# Patient Record
Sex: MALE | Race: ASIAN | Employment: OTHER | URBAN - METROPOLITAN AREA
[De-identification: names, ages, dates, MRNs, and addresses within clinical notes are randomized per-mention and may not be internally consistent; named-entity substitution may affect disease eponyms.]

---

## 2021-11-29 NOTE — PROGRESS NOTES
HPI: Az López (: 1960) is a 64 y.o. male, patient, here for evaluation of the following chief complaint(s):    New Patient (right wrist pain)  Patient is seen today to evaluate his right wrist.  He has complained of pain to the radial side of his right wrist for 6 months. He thought this may have started from playing tennis. He really denies any numbness or tingling although at times he will sleep with the brace for comfort. He states that the pain localization has been hard to pinpoint. He denies any left wrist or hand pain and is seen today for further treatment of his right wrist.    Vitals: Wt 145 lb (65.8 kg)   BMI 20.81 kg/m²    Body mass index is 20.81 kg/m². Allergies   Allergen Reactions    Pcn [Penicillins] Other (comments)     As a child    Sulfa (Sulfonamide Antibiotics) Rash       Current Outpatient Medications   Medication Sig    simvastatin (ZOCOR) 20 mg tablet TAKE 1 TABLET BY MOUTH EVERY EVENING    glucose blood VI test strips (ONETOUCH ULTRA TEST) strip Test daily    KOMBIGLYZE XR 2.5-1,000 mg TM24 TAKE 2 TABLETS BY MOUTH EVERY DAY AFTER BREAKFAST    repaglinide (PRANDIN) 2 mg tablet TAKE 1 TABLET BY MOUTH BEFORE BREAKFAST, LUNCH, AND DINNER. MAY TAKE 1 TO 2 TABLETS DEPENDING ON MEAL.  Blood-Glucose Meter monitoring kit accu check stacy meter    Lancets misc Test daily    glucose blood VI test strips (BLOOD GLUCOSE TEST) strip accu check stacy test daily    aspirin delayed-release 81 mg tablet Take  by mouth daily. No current facility-administered medications for this visit. Past Medical History:   Diagnosis Date    DM type 2 (diabetes mellitus, type 2) ([address] 2010    Other and unspecified hyperlipidemia         No past surgical history on file.     Family History   Problem Relation Age of Onset    Diabetes Mother     Heart Disease Father         pacemaker    Diabetes Maternal Grandmother     Diabetes Daughter         Social History     Tobacco Use    Smoking status: Never Smoker    Smokeless tobacco: Never Used   Substance Use Topics    Alcohol use: Yes     Alcohol/week: 1.7 standard drinks     Types: 2 Cans of beer per week    Drug use: No        Review of Systems    ROS     Positive for: Musculoskeletal (right wrist)    Negative for: Constitutional, Gastrointestinal, Neurological, Skin, Genitourinary, HENT, Endocrine, Cardiovascular, Eyes, Respiratory, Psychiatric, Allergic/Imm, Heme/Lymph    Last edited by Fidel Romero RN on 11/30/2021  1:55 PM. (History)             Physical Exam    Examination of the left wrist reveals no swelling, edema or warmth, no tenderness to palpation, no pain, normal strength and tone, normal range of motion, no crepitus, normal sensation, no instability or laxity and no known fractures or deformities. Examination of the left hand reveals no tenderness to palpation, no pain, normal  strength, normal tone, normal range of motion, no crepitus, no instability or laxity, no known fractures or deformities and normal sensation. Examination of the right wrist reveals no swelling, edema or warmth, no tenderness to palpation, no pain, normal strength and tone, normal range of motion, no crepitus, normal sensation, no instability or laxity and no known fractures or deformities. Examination of the right hand reveals no tenderness to palpation, no pain, normal  strength, normal tone, normal range of motion, no crepitus, no instability or laxity, no known fractures or deformities and normal sensation. The right wrist has localized discomfort at the dorsal radial side but had a negative Finkelstein test.  No pain with grind test in the basal joint which was smooth. Questionable discomfort near STT joint.   No cyst.    Imaging:    XR Results (most recent):  Results from Appointment encounter on 11/30/21    XR WRIST RT AP/LAT/OBL MIN 3V    Narrative  3 x-ray views of the right wrist appear to show normal osseous and joint space findings no evidence of fracture or significant arthritis. ASSESSMENT/PLAN:  Below is the assessment and plan developed based on review of pertinent history, physical exam, labs, studies, and medications. Patient's examination was consistent with radial sided right wrist pain tendinitis but possible occult ganglion or early STT arthritis cannot be excluded. He has been symptomatic since the spring and at this point it appears reasonable and appropriate to proceed with an MRI study to evaluate for an occult ganglion or deeper arthritic findings. He had been offered previously a steroid and deferred an injection. I will see him back once the MRI is completed for review. 1. Right wrist pain  -     XR WRIST RT AP/LAT/OBL MIN 3V; Future  -     MRI WRIST RT WO CONT; Future  2. Right wrist tendinitis      No follow-ups on file. An electronic signature was used to authenticate this note.   -- Farhana Dalton MD

## 2021-11-30 ENCOUNTER — OFFICE VISIT (OUTPATIENT)
Dept: ORTHOPEDIC SURGERY | Age: 61
End: 2021-11-30
Payer: COMMERCIAL

## 2021-11-30 VITALS — BODY MASS INDEX: 20.81 KG/M2 | WEIGHT: 145 LBS

## 2021-11-30 DIAGNOSIS — M25.531 RIGHT WRIST PAIN: Primary | ICD-10-CM

## 2021-11-30 DIAGNOSIS — M77.8 RIGHT WRIST TENDINITIS: ICD-10-CM

## 2021-11-30 PROCEDURE — 99203 OFFICE O/P NEW LOW 30 MIN: CPT | Performed by: ORTHOPAEDIC SURGERY

## 2021-12-28 ENCOUNTER — OFFICE VISIT (OUTPATIENT)
Dept: ORTHOPEDIC SURGERY | Age: 61
End: 2021-12-28
Payer: COMMERCIAL

## 2021-12-28 DIAGNOSIS — M77.8 RIGHT WRIST TENDINITIS: Primary | ICD-10-CM

## 2021-12-28 DIAGNOSIS — M67.431 GANGLION CYST OF VOLAR ASPECT OF RIGHT WRIST: ICD-10-CM

## 2021-12-28 DIAGNOSIS — M25.531 RIGHT WRIST PAIN: ICD-10-CM

## 2021-12-28 PROCEDURE — 99213 OFFICE O/P EST LOW 20 MIN: CPT | Performed by: ORTHOPAEDIC SURGERY

## 2021-12-28 NOTE — PATIENT INSTRUCTIONS
Ganglions: Care Instructions  Your Care Instructions     A ganglion is a small sac, or cyst, filled with a clear fluid that is like jelly. A ganglion may look like a bump on the hand or wrist. It also can appear on your feet, ankles, knees, or shoulders. It is not cancer. A ganglion can grow out of the protective area, or capsule, around a joint. It also can grow on a tendon sheath, which covers the ropelike tendons that connect muscle to bone. A ganglion may hurt or cause numbness if it presses on a nerve. Many ganglions do not need treatment, and they often go away on their own. But if a ganglion hurts, becomes larger, causes numbness, or limits your activity, your doctor may want to drain it with a needle and syringe or remove it with minor surgery. Follow-up care is a key part of your treatment and safety. Be sure to make and go to all appointments, and call your doctor if you are having problems. It's also a good idea to know your test results and keep a list of the medicines you take. How can you care for yourself at home? · Wear a wrist or finger splint as directed by your doctor. It will keep your wrist or hand from moving and help reduce the fluid in the cyst. This may be all you need for the ganglion to shrink and go away. · Do not smash a ganglion with a book or other heavy object. You may break a bone or otherwise injure your wrist by trying this folk remedy, and the ganglion may return anyway. · Do not try to drain the fluid by poking the ganglion with a pin or any other sharp object. You could cause an infection. When should you call for help? Call your doctor now or seek immediate medical care if:    · You have signs of infection, such as:  ? Increased pain, swelling, warmth, or redness. ? Red streaks leading from the cyst.  ? Pus draining from the cyst.  ? A fever.    Watch closely for changes in your health, and be sure to contact your doctor if:    · You have increasing pain.     · Your ganglion is getting larger.     · You still have pain or numbness from a ganglion. Where can you learn more? Go to http://www.gray.com/  Enter [id number] in the search box to learn more about \"Ganglions: Care Instructions. \"  Current as of: July 1, 2021               Content Version: 13.0  © 6947-1179 [employer]. Care instructions adapted under license by [employer] (which disclaims liability or warranty for this information). If you have questions about a medical condition or this instruction, always ask your healthcare professional. [place] [address] any warranty or liability for your use of this information.

## 2021-12-28 NOTE — PROGRESS NOTES
HPI: Erin Schreiber (: 1960) is a 64 y.o. male, patient, here for evaluation of the following chief complaint(s):    Results (his following up from his 21 visit to review his right wrist MRI which was completed on 21)  Patient is seen today to evaluate his right wrist.  He has complained of pain to the radial side of his right wrist for 6 months. He thought this may have started from playing tennis. He really denies any numbness or tingling although at times he will sleep with the brace for comfort. He states that the pain localization has been hard to pinpoint. He denies any left wrist or hand pain and is seen today for further treatment of his right wrist.    Vitals: There were no vitals taken for this visit. There is no height or weight on file to calculate BMI. Allergies   Allergen Reactions    Pcn [Penicillins] Other (comments)     As a child    Sulfa (Sulfonamide Antibiotics) Rash       Current Outpatient Medications   Medication Sig    simvastatin (ZOCOR) 20 mg tablet TAKE 1 TABLET BY MOUTH EVERY EVENING    glucose blood VI test strips (ONETOUCH ULTRA TEST) strip Test daily    KOMBIGLYZE XR 2.5-1,000 mg TM24 TAKE 2 TABLETS BY MOUTH EVERY DAY AFTER BREAKFAST    repaglinide (PRANDIN) 2 mg tablet TAKE 1 TABLET BY MOUTH BEFORE BREAKFAST, LUNCH, AND DINNER. MAY TAKE 1 TO 2 TABLETS DEPENDING ON MEAL.  Blood-Glucose Meter monitoring kit accu check stacy meter    Lancets misc Test daily    glucose blood VI test strips (BLOOD GLUCOSE TEST) strip accu check stacy test daily    aspirin delayed-release 81 mg tablet Take  by mouth daily. No current facility-administered medications for this visit. Past Medical History:   Diagnosis Date    [facility]    Other and unspecified hyperlipidemia         History reviewed. No pertinent surgical history.     Family History   Problem Relation Age of Onset    Diabetes Mother     Heart Disease Father         pacemaker    Diabetes Maternal Grandmother     Diabetes Daughter         Social History     Tobacco Use    Smoking status: Never Smoker    Smokeless tobacco: Never Used   Substance Use Topics    Alcohol use: Yes     Alcohol/week: 1.7 standard drinks     Types: 2 Cans of beer per week    Drug use: No        Review of Systems    ROS     Positive for: Musculoskeletal (right wrist pain)    Negative for: Constitutional, Gastrointestinal, Neurological, Skin, Genitourinary, HENT, Endocrine, Cardiovascular, Eyes, Respiratory, Psychiatric, Allergic/Imm, Heme/Lymph    Last edited by Bal Mccray on 12/28/2021  4:09 PM. (History)             Physical Exam    Examination of the left wrist reveals no swelling, edema or warmth, no tenderness to palpation, no pain, normal strength and tone, normal range of motion, no crepitus, normal sensation, no instability or laxity and no known fractures or deformities. Examination of the left hand reveals no tenderness to palpation, no pain, normal  strength, normal tone, normal range of motion, no crepitus, no instability or laxity, no known fractures or deformities and normal sensation. Examination of the right wrist reveals no swelling, edema or warmth, no tenderness to palpation, no pain, normal strength and tone, normal range of motion, no crepitus, normal sensation, no instability or laxity and no known fractures or deformities. Examination of the right hand reveals no tenderness to palpation, no pain, normal  strength, normal tone, normal range of motion, no crepitus, no instability or laxity, no known fractures or deformities and normal sensation. The right wrist has localized discomfort at the dorsal radial side but had a negative Finkelstein test.  No pain with grind test in the basal joint which was smooth. Questionable discomfort near STT joint.   No cyst.    Imaging:    XR Results (most recent):  Results from Appointment encounter on 11/30/21    XR WRIST RT AP/LAT/OBL MIN 3V    Narrative  3 x-ray views of the right wrist appear to show normal osseous and joint space findings no evidence of fracture or significant arthritis. MRI Results (most recent):  Results from Appointment encounter on 12/09/21    MRI WRIST RT WO CONT    Narrative  EXAM: MRI WRIST RT WO CONT    INDICATION: Wrist pain. Evaluate for occult ganglion. COMPARISON: Radiographs 11/30/2021    TECHNIQUE: Axial T2 fat-saturated; coronal proton density fat-saturated, T2  fat-saturated, T1, and gradient echo; and sagittal T2 fat-saturated MRI of the  right wrist .    CONTRAST: None. FINDINGS: Bone marrow: No acute fracture, dislocation, or marrow replacing  process. Articular cartilage: There is a full-thickness cartilage defect of the lunate  bone measuring approximately 3 x 4 mm with underlying subchondral cyst formation  and mild edema. Joint fluid: None. Triangular fibrocartilage complex: There is a small focus of undersurface  irregularity in the articular disc. Lunotriquetral and scapholunate ligaments: appear intact on this study. Extensor tendons: Intact. Flexor tendons: Intact. Muscles: Within normal limits. Carpal tunnel and Guyon's canal: Normal appearance. Median nerve is normal in  signal and size. Soft tissue mass: There is a thin tubular ganglion volar to the radio scaphoid  joint. This has a curvilinear path underneath the distal radius in the overall  length measures approximately 26 mm. The diameter measures at most 4 mm. Impression  1. Small full-thickness cartilage defect in the lunate with underlying mild  edema. 2. Small focus of undersurface irregularity in the articular disc of the TFCC  likely related to degenerative change. 3. Tubular ganglion cyst extending volar to the radio scaphoid joint underneath  the distal radius.       ASSESSMENT/PLAN:  Below is the assessment and plan developed based on review of pertinent history, physical exam, labs, studies, and medications. Patient's examination was consistent with radial sided right wrist pain tendinitis but possible occult ganglion or early STT arthritis cannot be excluded. He has been symptomatic since the spring and at this point it appears reasonable and appropriate to proceed with an MRI study to evaluate for an occult ganglion or deeper arthritic findings. The MRI did show an occult ganglion cyst that was somewhat tubular in nature that is really not clinically palpable. For now he wants to pursue a course of conservative nonoperative management. The MRI also shows a small full-thickness cartilage defect reportedly in the lunate but on review it is really in the triquetrum. There could be a component of impaction although there seems to be slight ulnar negative variance. I suggested to him that if he were to consider surgery to remove the ganglion he could as well undergo a diagnostic wrist arthroscopy. He was relatively asymptomatic on today's visit having recently improved with naproxen and I recommended conservative care. He may return anytime for further treatment. 1. Right wrist tendinitis  2. Right wrist pain  3. Ganglion cyst of volar aspect of right wrist      Return if symptoms worsen or fail to improve. An electronic signature was used to authenticate this note.   -- Jazzy Adams MD

## 2023-05-12 RX ORDER — SAXAGLIPTIN AND METFORMIN HYDROCHLORIDE 2.5; 1 MG/1; MG/1
TABLET, FILM COATED, EXTENDED RELEASE ORAL
COMMUNITY
Start: 2015-08-03

## 2023-05-12 RX ORDER — SIMVASTATIN 20 MG
1 TABLET ORAL NIGHTLY
COMMUNITY
Start: 2015-12-10

## 2023-05-12 RX ORDER — REPAGLINIDE 2 MG/1
TABLET ORAL
COMMUNITY
Start: 2015-07-06

## 2023-05-12 RX ORDER — ASPIRIN 81 MG/1
TABLET ORAL DAILY
COMMUNITY

## 2023-05-12 RX ORDER — LANCETS 30 GAUGE
EACH MISCELLANEOUS
COMMUNITY
Start: 2014-08-18

## 2025-04-13 ENCOUNTER — OFFICE VISIT (OUTPATIENT)
Age: 65
End: 2025-04-13

## 2025-04-13 VITALS
DIASTOLIC BLOOD PRESSURE: 75 MMHG | OXYGEN SATURATION: 97 % | RESPIRATION RATE: 18 BRPM | SYSTOLIC BLOOD PRESSURE: 127 MMHG | WEIGHT: 156 LBS | TEMPERATURE: 97.7 F | HEART RATE: 63 BPM

## 2025-04-13 DIAGNOSIS — M10.9 ACUTE GOUT OF RIGHT HAND, UNSPECIFIED CAUSE: Primary | ICD-10-CM

## 2025-04-13 RX ORDER — FERROUS SULFATE 325(65) MG
325 TABLET ORAL
COMMUNITY
Start: 2024-05-01 | End: 2025-05-01

## 2025-04-13 RX ORDER — BLOOD SUGAR DIAGNOSTIC
STRIP MISCELLANEOUS
COMMUNITY

## 2025-04-13 RX ORDER — PROCHLORPERAZINE 25 MG/1
SUPPOSITORY RECTAL
COMMUNITY
Start: 2025-03-25

## 2025-04-13 RX ORDER — INSULIN GLARGINE 100 [IU]/ML
INJECTION, SOLUTION SUBCUTANEOUS
COMMUNITY

## 2025-04-13 RX ORDER — ACYCLOVIR 400 MG/1
TABLET ORAL
COMMUNITY
Start: 2025-04-03

## 2025-04-13 RX ORDER — PREDNISONE 20 MG/1
20 TABLET ORAL DAILY
Qty: 5 TABLET | Refills: 0 | Status: SHIPPED | OUTPATIENT
Start: 2025-04-13 | End: 2025-04-18

## 2025-04-13 RX ORDER — METFORMIN HYDROCHLORIDE 500 MG/1
1000 TABLET, EXTENDED RELEASE ORAL 2 TIMES DAILY
COMMUNITY
Start: 2024-10-01

## 2025-04-13 RX ORDER — EMPAGLIFLOZIN 25 MG/1
25 TABLET, FILM COATED ORAL DAILY
COMMUNITY

## 2025-04-13 NOTE — PROGRESS NOTES
2025   Basilio Walter (: 1960) is a 64 y.o. male, New patient, here for evaluation of the following chief complaint(s):  Finger Pain (Pt present with right thumb pain and swelling, started 2 days ago. )     ASSESSMENT/PLAN:  Below is the assessment and plan developed based on review of pertinent history, physical exam, labs, studies, and medications.  1. Acute gout of right hand, unspecified cause  -     predniSONE (DELTASONE) 20 MG tablet; Take 1 tablet by mouth daily for 5 days, Disp-5 tablet, R-0Normal    There is no indication of bacterial infection at this time. Suspect acute gout based upon presentation today and report of history of gout. Patient reports that he is unable to tolerate NSAIDS due to GI issues. Ordered prednisone burst today. Patient advised to take medication with food in the mornings. Advised that he may use ice pack to site to help relieve pain and swelling. Discussed avoidance of foods and drinks that may trigger gout (red meat, seafood, alcohol, beverages containing high fructose corn syrup, etc.). Patient is a diabetic and reports that his most recent A1C was around 7.2. Patient advised to monitor glucose closely while taking prednisone as it may cause increase in blood glucose. Patient reports that he has a continuous glucose monitor. States that he is able to adjust his insulin if needed. Follow up with PCP if symptoms do not resolve. Patient advised to go to the ED for any acute or worsening of symptoms (ex. Increased pain or swelling of thumb, fever, decreased sensation in thumb).   Handout given with care instructions for gout  2. Increase fluid intake, ensure adequate nutritional intake.  3. Follow up with PCP as needed.  4. Go to ED with development of any acute symptoms.     Follow up:    Follow up immediately for any new, worsening or changes or if symptoms are not improving over the next 5-7 days.     SUBJECTIVE/OBJECTIVE:  64-year-old patient here today with

## 2025-04-14 ENCOUNTER — OFFICE VISIT (OUTPATIENT)
Age: 65
End: 2025-04-14

## 2025-04-14 VITALS
DIASTOLIC BLOOD PRESSURE: 84 MMHG | TEMPERATURE: 97.8 F | RESPIRATION RATE: 16 BRPM | WEIGHT: 156 LBS | OXYGEN SATURATION: 99 % | SYSTOLIC BLOOD PRESSURE: 150 MMHG

## 2025-04-14 DIAGNOSIS — M10.9 ACUTE GOUT OF RIGHT HAND, UNSPECIFIED CAUSE: Primary | ICD-10-CM

## 2025-04-14 RX ORDER — INDOMETHACIN 50 MG/1
50 CAPSULE ORAL 3 TIMES DAILY
Qty: 30 CAPSULE | Refills: 3 | Status: SHIPPED | OUTPATIENT
Start: 2025-04-14

## 2025-04-14 ASSESSMENT — ENCOUNTER SYMPTOMS
VOMITING: 0
NAUSEA: 0

## 2025-04-14 NOTE — PROGRESS NOTES
Subjective     Chief Complaint   Patient presents with    Follow-up     DOS 4/13/25 - pt was seen yesterday for RT thumb for possible Gout. Reports of no change to his symptoms. He took Prednisone yesterday and today but denies any relief. He would like to discuss other options for treatment. Denies any injury. Onset of pain - 3 days, sudden onset of pain with swelling.       HPI 64-year-old male presents for follow-up of his gout attack in his right thumb he has been taking 20 mg of prednisone yesterday and 1 today but does not get any better.    Past Medical History:   Diagnosis Date    DM type 2 (diabetes mellitus, type 2) ([facility]) 6/8/2010    Other and unspecified hyperlipidemia        History reviewed. No pertinent surgical history.    Family History   Problem Relation Age of Onset    Diabetes Daughter     Diabetes Maternal Grandmother     Heart Disease Father         pacemaker    Diabetes Mother        Allergies   Allergen Reactions    Penicillins Other (See Comments)     As a child    Sulfa Antibiotics Rash       Social History     Tobacco Use    Smoking status: Never    Smokeless tobacco: Never   Substance Use Topics    Alcohol use: Yes     Alcohol/week: 1.7 standard drinks of alcohol    Drug use: No       Vitals:    04/14/25 1345   BP: (!) 150/84   Resp:    Temp:    SpO2:        Objective     Review of Systems   Constitutional:  Negative for chills and fever.   Gastrointestinal:  Negative for nausea and vomiting.   Musculoskeletal:         Right thumb pain       Physical Exam  Vitals reviewed.   Constitutional:       Appearance: Normal appearance.   Cardiovascular:      Rate and Rhythm: Normal rate and regular rhythm.      Heart sounds: Normal heart sounds.   Pulmonary:      Effort: Pulmonary effort is normal.      Breath sounds: Normal breath sounds.   Musculoskeletal:         General: Normal range of motion.      Comments: Right thumb is examined there is some swelling noted and exquisite tenderness

## 2025-04-14 NOTE — PATIENT INSTRUCTIONS
Patient will start the prednisone and start the Indocin 3 times a day follow-up with PCP as needed